# Patient Record
Sex: FEMALE | Race: WHITE | Employment: FULL TIME | URBAN - METROPOLITAN AREA
[De-identification: names, ages, dates, MRNs, and addresses within clinical notes are randomized per-mention and may not be internally consistent; named-entity substitution may affect disease eponyms.]

---

## 2017-02-21 ENCOUNTER — OFFICE VISIT (OUTPATIENT)
Dept: FAMILY MEDICINE CLINIC | Facility: CLINIC | Age: 37
End: 2017-02-21

## 2017-02-21 VITALS
HEART RATE: 94 BPM | SYSTOLIC BLOOD PRESSURE: 118 MMHG | OXYGEN SATURATION: 98 % | BODY MASS INDEX: 32 KG/M2 | DIASTOLIC BLOOD PRESSURE: 72 MMHG | RESPIRATION RATE: 18 BRPM | WEIGHT: 204 LBS | TEMPERATURE: 98 F

## 2017-02-21 DIAGNOSIS — Z86.79 HISTORY OF RHEUMATIC FEVER: ICD-10-CM

## 2017-02-21 DIAGNOSIS — J02.9 SORE THROAT: Primary | ICD-10-CM

## 2017-02-21 DIAGNOSIS — Z20.818 STREP THROAT EXPOSURE: ICD-10-CM

## 2017-02-21 PROCEDURE — 99213 OFFICE O/P EST LOW 20 MIN: CPT | Performed by: PHYSICIAN ASSISTANT

## 2017-02-21 RX ORDER — AMOXICILLIN 875 MG/1
875 TABLET, COATED ORAL 2 TIMES DAILY
Qty: 20 TABLET | Refills: 0 | Status: SHIPPED | OUTPATIENT
Start: 2017-02-21 | End: 2017-03-03

## 2017-02-21 RX ORDER — LIOTHYRONINE SODIUM 5 UG/1
15 TABLET ORAL DAILY
COMMUNITY

## 2017-02-22 NOTE — PATIENT INSTRUCTIONS
1.  Amoxicillin 875 mg twice daily for 10 days. Recommend probiotics while on this medication to prevent antibiotics associated diarrhea or yeast infections.   Examples include yogurt with active live cultures; or in capsule/granule forms such as [employer] improving, condition worsens, or fever greater than or equal to 100.4 persists for 72 hours.     · Be sure to finish entire course of antibiotics to reduce risk of reinfection or antibiotic resistance

## 2017-10-02 ENCOUNTER — APPOINTMENT (OUTPATIENT)
Dept: GENERAL RADIOLOGY | Age: 37
End: 2017-10-02
Attending: EMERGENCY MEDICINE
Payer: COMMERCIAL

## 2017-10-02 ENCOUNTER — HOSPITAL ENCOUNTER (OUTPATIENT)
Age: 37
Discharge: HOME | End: 2017-10-02
Attending: EMERGENCY MEDICINE
Payer: COMMERCIAL

## 2017-10-02 VITALS
HEART RATE: 75 BPM | WEIGHT: 190 LBS | OXYGEN SATURATION: 99 % | TEMPERATURE: 98 F | BODY MASS INDEX: 29.47 KG/M2 | HEIGHT: 67.5 IN | RESPIRATION RATE: 16 BRPM | SYSTOLIC BLOOD PRESSURE: 124 MMHG | DIASTOLIC BLOOD PRESSURE: 76 MMHG

## 2017-10-02 DIAGNOSIS — S63.501A SPRAIN OF RIGHT WRIST, INITIAL ENCOUNTER: Primary | ICD-10-CM

## 2017-10-02 PROCEDURE — 73110 X-RAY EXAM OF WRIST: CPT | Performed by: EMERGENCY MEDICINE

## 2017-10-02 PROCEDURE — 99213 OFFICE O/P EST LOW 20 MIN: CPT

## 2017-10-02 RX ORDER — LEVOTHYROXINE SODIUM 0.03 MG/1
25 TABLET ORAL
COMMUNITY

## 2017-10-02 NOTE — ED INITIAL ASSESSMENT (HPI)
Patient c/o right wrist pain. States she went to drop a 145lb barbell and thinks the bar hyperextended her wrist. Patient able to bend, but does not have full rom.

## 2017-10-03 NOTE — ED PROVIDER NOTES
Patient presents with:  Wrist Pain    HPI:     Bia Mayo is a 40year old female who presents with chief complaint of R wrist injury.   Two days ago pt was doing cleans and went to put the 145 lb weight back on the rack and hyperextended the R wrist. CONCLUSION:  No acute visible fracture. See above description. Dictated by: Santo Serna MD on 10/02/2017 at 18:39     Approved by: Santo Serna MD              Assessment/Plan:     Diagnosis:  Wrist sprain    Plan:  1.   Supportive care - NSAID/APAP,

## 2017-12-27 ENCOUNTER — OFFICE VISIT (OUTPATIENT)
Dept: FAMILY MEDICINE CLINIC | Facility: CLINIC | Age: 37
End: 2017-12-27

## 2017-12-27 VITALS
HEART RATE: 80 BPM | OXYGEN SATURATION: 98 % | SYSTOLIC BLOOD PRESSURE: 114 MMHG | TEMPERATURE: 98 F | DIASTOLIC BLOOD PRESSURE: 78 MMHG | RESPIRATION RATE: 16 BRPM

## 2017-12-27 DIAGNOSIS — J01.00 ACUTE NON-RECURRENT MAXILLARY SINUSITIS: Primary | ICD-10-CM

## 2017-12-27 DIAGNOSIS — H65.92 LEFT NON-SUPPURATIVE OTITIS MEDIA: ICD-10-CM

## 2017-12-27 PROCEDURE — 99213 OFFICE O/P EST LOW 20 MIN: CPT | Performed by: PHYSICIAN ASSISTANT

## 2017-12-27 RX ORDER — AMOXICILLIN AND CLAVULANATE POTASSIUM 875; 125 MG/1; MG/1
1 TABLET, FILM COATED ORAL 2 TIMES DAILY
Qty: 20 TABLET | Refills: 0 | Status: SHIPPED | OUTPATIENT
Start: 2017-12-27 | End: 2018-01-06

## 2017-12-27 RX ORDER — METHYLPREDNISOLONE 4 MG/1
TABLET ORAL
Qty: 1 KIT | Refills: 0 | Status: SHIPPED | OUTPATIENT
Start: 2017-12-27 | End: 2018-03-04

## 2017-12-27 NOTE — PATIENT INSTRUCTIONS
1 . Augmentin 875 mg twice daily for 10 days. Recommend probiotics while on this medication to prevent antibiotics associated diarrhea or yeast infections.   Examples include yogurt with active live cultures; or in capsule/granule forms such as Florastor, · Over-the-counter decongestants may be used unless a similar medicine was prescribed. Nasal sprays work the fastest. Use one that contains phenylephrine or oxymetazoline. First blow the nose gently. Then use the spray.  Do not use these medicines more ofte © 4895-6028 [employer] [address] [facility], [facility] [place]. All rights reserved. This information is not intended as a substitute for professional medical care. Always follow your healthcare professional's instructions.

## 2017-12-27 NOTE — PROGRESS NOTES
CHIEF COMPLAINT:   Patient presents with:  Sinusitis      HPI:   Lacey Kenney is a 40year old female who presents for cold symptoms for  5  days. Symptoms have progressed into sinus congestion and been worsening since onset.  Sinus congestion/pain is de NEURO: + sinus headaches. No numbness or tingling in face.     EXAM:   /78   Pulse 80   Temp 97.8 °F (36.6 °C) (Oral)   Resp 16   LMP 12/06/2017   SpO2 98%   GENERAL: well developed, well nourished,in no apparent distress  SKIN: no rashes,no suspicio 2.  Medrol dose pack as prescribed. This is an oral steroid to help with inflammation/congestion. Sinusitis (Antibiotic Treatment)    The sinuses are air-filled spaces within the bones of the face.  They connect to the inside of the nose. Sinusitis i · Over-the-counter antihistamines may help if allergies contributed to your sinusitis.    · Do not use nasal rinses or irrigation during an acute sinus infection, unless told to by your health care provider.  Rinsing may spread the infection to other sinuse

## 2018-03-04 ENCOUNTER — OFFICE VISIT (OUTPATIENT)
Dept: FAMILY MEDICINE CLINIC | Facility: CLINIC | Age: 38
End: 2018-03-04

## 2018-03-04 VITALS
OXYGEN SATURATION: 98 % | SYSTOLIC BLOOD PRESSURE: 100 MMHG | DIASTOLIC BLOOD PRESSURE: 62 MMHG | TEMPERATURE: 98 F | HEART RATE: 81 BPM

## 2018-03-04 DIAGNOSIS — H65.92 LEFT NON-SUPPURATIVE OTITIS MEDIA: Primary | ICD-10-CM

## 2018-03-04 DIAGNOSIS — J30.9 ALLERGIC RHINITIS, UNSPECIFIED SEASONALITY, UNSPECIFIED TRIGGER: ICD-10-CM

## 2018-03-04 PROCEDURE — 99213 OFFICE O/P EST LOW 20 MIN: CPT | Performed by: NURSE PRACTITIONER

## 2018-03-04 RX ORDER — FLUTICASONE PROPIONATE 50 MCG
2 SPRAY, SUSPENSION (ML) NASAL DAILY
Qty: 1 BOTTLE | Refills: 3 | Status: SHIPPED | OUTPATIENT
Start: 2018-03-04 | End: 2019-02-27

## 2018-03-04 RX ORDER — AMOXICILLIN 875 MG/1
875 TABLET, COATED ORAL 2 TIMES DAILY
Qty: 20 TABLET | Refills: 0 | Status: SHIPPED | OUTPATIENT
Start: 2018-03-04 | End: 2018-03-14

## 2018-03-04 NOTE — PROGRESS NOTES
CHIEF COMPLAINT:   Patient presents with:  Ear Pain      HPI:   Isabela Rogel is a 40year old female who presents to clinic today with complaints of left ear pain. Has had for 1  days. Pain is described as constant and itch.   Patient denies history of GENERAL: well developed, well nourished,in no apparent distress  SKIN: no rashes,no suspicious lesions  HEAD: atraumatic, normocephalic  EYES: conjunctiva clear, EOM intact  EARS: bilat tragus none tender with manipulation. External auditory canals wnl.  R Patient advised to start antihistamine (Claritin/Zyrtec/Allegra) daily along with nasal steroid spray daily for one month and then stop. IF the sinus congestion returns to restart the allergy medications for the entire growing season.          Allergic Rhin · If possible, sleep in a room with no carpet, curtains, or upholstered furniture. Cockroaches:  · Store food in sealed containers. · Remove garbage from the home promptly.   · Fix water leaks  Mold:  · Keep humidity low by using a dehumidifier or air con

## 2018-03-04 NOTE — PATIENT INSTRUCTIONS
Patient advised to start antihistamine (Claritin/Zyrtec/Allegra) daily along with nasal steroid spray daily for one month and then stop. IF the sinus congestion returns to restart the allergy medications for the entire growing season.          Allergic Rhin · If possible, sleep in a room with no carpet, curtains, or upholstered furniture. Cockroaches:  · Store food in sealed containers. · Remove garbage from the home promptly.   · Fix water leaks  Mold:  · Keep humidity low by using a dehumidifier or air con

## 2019-06-23 ENCOUNTER — HOSPITAL ENCOUNTER (OUTPATIENT)
Age: 39
Discharge: HOME | End: 2019-06-23
Attending: EMERGENCY MEDICINE
Payer: COMMERCIAL

## 2019-06-23 VITALS
BODY MASS INDEX: 28.85 KG/M2 | SYSTOLIC BLOOD PRESSURE: 97 MMHG | TEMPERATURE: 97 F | HEIGHT: 67.5 IN | OXYGEN SATURATION: 100 % | HEART RATE: 61 BPM | WEIGHT: 186 LBS | DIASTOLIC BLOOD PRESSURE: 65 MMHG | RESPIRATION RATE: 16 BRPM

## 2019-06-23 DIAGNOSIS — O21.0 HYPEREMESIS GRAVIDARUM: Primary | ICD-10-CM

## 2019-06-23 DIAGNOSIS — E06.3 HYPOTHYROIDISM DUE TO HASHIMOTO'S THYROIDITIS: ICD-10-CM

## 2019-06-23 DIAGNOSIS — E03.8 HYPOTHYROIDISM DUE TO HASHIMOTO'S THYROIDITIS: ICD-10-CM

## 2019-06-23 DIAGNOSIS — R00.2 PALPITATIONS: ICD-10-CM

## 2019-06-23 PROCEDURE — 99215 OFFICE O/P EST HI 40 MIN: CPT

## 2019-06-23 PROCEDURE — 81002 URINALYSIS NONAUTO W/O SCOPE: CPT | Performed by: EMERGENCY MEDICINE

## 2019-06-23 PROCEDURE — 80047 BASIC METABLC PNL IONIZED CA: CPT

## 2019-06-23 PROCEDURE — 93010 ELECTROCARDIOGRAM REPORT: CPT

## 2019-06-23 PROCEDURE — 84439 ASSAY OF FREE THYROXINE: CPT | Performed by: EMERGENCY MEDICINE

## 2019-06-23 PROCEDURE — 96361 HYDRATE IV INFUSION ADD-ON: CPT

## 2019-06-23 PROCEDURE — 99214 OFFICE O/P EST MOD 30 MIN: CPT

## 2019-06-23 PROCEDURE — 93005 ELECTROCARDIOGRAM TRACING: CPT

## 2019-06-23 PROCEDURE — 84443 ASSAY THYROID STIM HORMONE: CPT | Performed by: EMERGENCY MEDICINE

## 2019-06-23 PROCEDURE — 96360 HYDRATION IV INFUSION INIT: CPT

## 2019-06-23 PROCEDURE — 85025 COMPLETE CBC W/AUTO DIFF WBC: CPT | Performed by: EMERGENCY MEDICINE

## 2019-06-23 RX ORDER — ONDANSETRON 4 MG/1
8 TABLET, ORALLY DISINTEGRATING ORAL ONCE
Status: COMPLETED | OUTPATIENT
Start: 2019-06-23 | End: 2019-06-23

## 2019-06-23 RX ORDER — SODIUM CHLORIDE 9 MG/ML
INJECTION, SOLUTION INTRAVENOUS ONCE
Status: COMPLETED | OUTPATIENT
Start: 2019-06-23 | End: 2019-06-23

## 2019-06-23 RX ORDER — SODIUM CHLORIDE 9 MG/ML
1000 INJECTION, SOLUTION INTRAVENOUS ONCE
Status: COMPLETED | OUTPATIENT
Start: 2019-06-23 | End: 2019-06-23

## 2019-06-23 RX ORDER — ONDANSETRON 4 MG/1
TABLET, ORALLY DISINTEGRATING ORAL EVERY 8 HOURS PRN
Qty: 20 TABLET | Refills: 30 | Status: SHIPPED | OUTPATIENT
Start: 2019-06-23 | End: 2019-06-30

## 2019-06-23 NOTE — ED INITIAL ASSESSMENT (HPI)
Patient is 9 weeks pregnant. Around week 6 she started almost constant nausea and vomiting. She is feeling weak and now has a headache as well. She tried Zofran without much improvement.  She is getting in some po fluids but much less than normal. Her urine

## 2019-06-23 NOTE — ED PROVIDER NOTES
Patient presents with:  Nausea/vomiting  Pregnancy      HPI:     Lavern Guillen is a 45year old female who is currently pregnant at 10 weeks age of gestation. Complains of generalized fatigue and decreased energy and persistent nausea.   No recent episode T4      POCT urinalysis dipstick Once      POCT CBC Once      EKG 12 Lead      POCT ISTAT chem8 cartridge Once      iStat (Chem 8)      Place PIV Once          Order Comments:              Saline lock      ondansetron (ZOFRAN-ODT) disintegrating tab 8 mg 1.0 X10ˆ3/uL    Neutrophil % 67.2 %    Lymphocyte % 26.6 %    Mixed Cell % 6.2 %   TSH+FREE T4    Collection Time: 06/23/19 10:16 AM   Result Value Ref Range    Free T4 0.8 0.8 - 1.7 ng/dL    TSH 0.730 0.358 - 3.740 mIU/mL   POCT ISTAT CHEM8 CARTRIDGE    C still with considerable nausea. No emesis here. All laboratories reviewed and are within normal limits. Await TSH.   Patient complains of on and off palpitations cannot rule out A. fib with her remote history of it in the past. Close follow up with prima

## 2019-07-04 ENCOUNTER — HOSPITAL ENCOUNTER (OUTPATIENT)
Age: 39
Discharge: HOME | End: 2019-07-04
Attending: FAMILY MEDICINE
Payer: COMMERCIAL

## 2019-07-04 VITALS
RESPIRATION RATE: 20 BRPM | DIASTOLIC BLOOD PRESSURE: 59 MMHG | SYSTOLIC BLOOD PRESSURE: 116 MMHG | HEART RATE: 70 BPM | OXYGEN SATURATION: 100 % | TEMPERATURE: 99 F

## 2019-07-04 DIAGNOSIS — O21.9 NAUSEA AND VOMITING IN PREGNANCY: Primary | ICD-10-CM

## 2019-07-04 LAB
#MXD IC: 0.4 X10ˆ3/UL (ref 0.1–1)
CREAT BLD-MCNC: 0.5 MG/DL (ref 0.55–1.02)
GLUCOSE BLD-MCNC: 87 MG/DL (ref 70–99)
HCT VFR BLD AUTO: 34.2 % (ref 35–48)
HGB BLD-MCNC: 11.9 G/DL (ref 12–16)
ISTAT BUN: 5 MG/DL (ref 8–20)
ISTAT CHLORIDE: 106 MMOL/L (ref 101–111)
ISTAT HEMATOCRIT: 34 % (ref 34–50)
ISTAT IONIZED CALCIUM FOR CHEM 8: 1.14 MMOL/L (ref 1.12–1.32)
ISTAT POTASSIUM: 3.7 MMOL/L (ref 3.6–5.1)
ISTAT SODIUM: 137 MMOL/L (ref 136–145)
LYMPHOCYTES # BLD AUTO: 1.8 X10ˆ3/UL (ref 1–4)
LYMPHOCYTES NFR BLD AUTO: 27.8 %
MCH RBC QN AUTO: 30.3 PG (ref 26–34)
MCHC RBC AUTO-ENTMCNC: 34.8 G/DL (ref 31–37)
MCV RBC AUTO: 87 FL (ref 80–100)
MIXED CELL %: 5.9 %
NEUTROPHILS # BLD AUTO: 4.4 X10ˆ3/UL (ref 1.5–7.7)
NEUTROPHILS NFR BLD AUTO: 66.3 %
PLATELET # BLD AUTO: 253 X10ˆ3/UL (ref 150–450)
POCT BILIRUBIN URINE: NEGATIVE
POCT BLOOD URINE: NEGATIVE
POCT GLUCOSE URINE: NEGATIVE MG/DL
POCT KETONE URINE: NEGATIVE MG/DL
POCT LEUKOCYTE ESTERASE URINE: NEGATIVE
POCT NITRITE URINE: NEGATIVE
POCT PH URINE: 8 (ref 5–8)
POCT SPECIFIC GRAVITY URINE: 1.02
POCT UROBILINOGEN URINE: 0.2 MG/DL
RBC # BLD AUTO: 3.93 X10ˆ6/UL (ref 3.8–5.3)
WBC # BLD AUTO: 6.6 X10ˆ3/UL (ref 4–11)

## 2019-07-04 PROCEDURE — 99214 OFFICE O/P EST MOD 30 MIN: CPT

## 2019-07-04 PROCEDURE — 96361 HYDRATE IV INFUSION ADD-ON: CPT

## 2019-07-04 PROCEDURE — 99215 OFFICE O/P EST HI 40 MIN: CPT

## 2019-07-04 PROCEDURE — 85025 COMPLETE CBC W/AUTO DIFF WBC: CPT | Performed by: FAMILY MEDICINE

## 2019-07-04 PROCEDURE — 96374 THER/PROPH/DIAG INJ IV PUSH: CPT

## 2019-07-04 PROCEDURE — 81002 URINALYSIS NONAUTO W/O SCOPE: CPT | Performed by: FAMILY MEDICINE

## 2019-07-04 PROCEDURE — 80047 BASIC METABLC PNL IONIZED CA: CPT

## 2019-07-04 RX ORDER — SODIUM CHLORIDE 9 MG/ML
1000 INJECTION, SOLUTION INTRAVENOUS ONCE
Status: COMPLETED | OUTPATIENT
Start: 2019-07-04 | End: 2019-07-04

## 2019-07-04 RX ORDER — ONDANSETRON 2 MG/ML
4 INJECTION INTRAMUSCULAR; INTRAVENOUS ONCE
Status: COMPLETED | OUTPATIENT
Start: 2019-07-04 | End: 2019-07-04

## 2019-07-04 RX ORDER — ACETAMINOPHEN 500 MG
1000 TABLET ORAL ONCE
Status: COMPLETED | OUTPATIENT
Start: 2019-07-04 | End: 2019-07-04

## 2019-07-06 NOTE — ED PROVIDER NOTES
Patient Seen in: [id number] [facility]    History   Patient presents with:  Vomiting    Stated Complaint: Vomiting    HPI    45year old female presents for nausea and vomiting. States she is 11 weeks pregnant.  States she has severe nausea and normal mood and affect.              ED Course     Labs Reviewed   POCT CBC - Abnormal; Notable for the following components:       Result Value    HGB IC 11.9 (*)     HCT IC 34.2 (*)     All other components within normal limits   POCT URINALYSIS DIPSTICK

## (undated) NOTE — MR AVS SNAPSHOT
[address] [facility]  [place] [address]  [phone]               Thank you for choosing us for your health care visit with Jessica Gonzales PA-C.   We are glad to serve you and happy to provide you with solutions (they help the body absorb the fluids more rapidly). Avoid caffeine because it can cause water loss.  Sometimes cold beverages, Popsicles, and ice cream can be soothing and beneficial   · Obtain a new toothbrush after you have been on antibiotics [address] [facility] [address]     Phone:  [phone] - amoxicillin 875 MG Tabs            [employer]     Sign up for [employer], your secure online medical record.   [employer] will allow you to access patient instructions from your recent visit, Start activities slowly and build up over time Do what you like   Get your heart pumping – brisk walking, biking, swimming Even 10 minute increments are effective and add up over the week   2 ½ hours per week – spread out over time Use a junie to keep you